# Patient Record
Sex: FEMALE | Race: WHITE
[De-identification: names, ages, dates, MRNs, and addresses within clinical notes are randomized per-mention and may not be internally consistent; named-entity substitution may affect disease eponyms.]

---

## 2020-04-26 ENCOUNTER — MESSAGE (OUTPATIENT)
Age: 30
End: 2020-04-26

## 2020-05-02 PROBLEM — Z00.00 ENCOUNTER FOR PREVENTIVE HEALTH EXAMINATION: Status: ACTIVE | Noted: 2020-05-02

## 2020-05-06 ENCOUNTER — APPOINTMENT (OUTPATIENT)
Dept: DISASTER EMERGENCY | Facility: CLINIC | Age: 30
End: 2020-05-06

## 2020-05-07 LAB
SARS-COV-2 IGG SERPL IA-ACNC: 1.3 RATIO
SARS-COV-2 IGG SERPL QL IA: POSITIVE

## 2022-08-25 ENCOUNTER — TRANSCRIPTION ENCOUNTER (OUTPATIENT)
Age: 32
End: 2022-08-25

## 2022-09-06 ENCOUNTER — OUTPATIENT (OUTPATIENT)
Dept: OUTPATIENT SERVICES | Facility: HOSPITAL | Age: 32
LOS: 1 days | Discharge: HOME | End: 2022-09-06

## 2022-09-06 DIAGNOSIS — H81.10 BENIGN PAROXYSMAL VERTIGO, UNSPECIFIED EAR: ICD-10-CM

## 2022-09-07 ENCOUNTER — RESULT REVIEW (OUTPATIENT)
Age: 32
End: 2022-09-07

## 2022-09-09 ENCOUNTER — APPOINTMENT (OUTPATIENT)
Dept: NEUROLOGY | Facility: CLINIC | Age: 32
End: 2022-09-09

## 2022-09-09 VITALS — DIASTOLIC BLOOD PRESSURE: 81 MMHG | SYSTOLIC BLOOD PRESSURE: 117 MMHG | HEART RATE: 97 BPM

## 2022-09-09 VITALS
BODY MASS INDEX: 25.11 KG/M2 | DIASTOLIC BLOOD PRESSURE: 72 MMHG | WEIGHT: 160 LBS | TEMPERATURE: 97.8 F | HEART RATE: 75 BPM | SYSTOLIC BLOOD PRESSURE: 111 MMHG | OXYGEN SATURATION: 100 % | HEIGHT: 67 IN

## 2022-09-09 DIAGNOSIS — Z78.9 OTHER SPECIFIED HEALTH STATUS: ICD-10-CM

## 2022-09-09 DIAGNOSIS — R42 DIZZINESS AND GIDDINESS: ICD-10-CM

## 2022-09-09 PROCEDURE — 99204 OFFICE O/P NEW MOD 45 MIN: CPT

## 2022-09-09 RX ORDER — MONTELUKAST SODIUM 10 MG/1
10 TABLET, FILM COATED ORAL
Refills: 0 | Status: ACTIVE | COMMUNITY

## 2022-09-09 NOTE — PHYSICAL EXAM
[FreeTextEntry1] : MS: Awake, alert, oriented to person, place, situation and time.  Follows commands. \par \par Language: Speech is clear, fluent. No dysarthria. \par \par CNs 2 - 12 intact. EOMI no nystagmus, no diplopia. No facial asymmetry b/l. Tongue midline, normal movements, no atrophy.\par \par Motor: Normal muscle bulk & tone. No noticeable tremor or seizure. No pronator drift. Muscle strength of b/l UE and b/l LE 5/5. \par \par Sensation: Intact to LT b/l throughout\par \par Cortical: No extinction\par \par Coordination: Intact rapid-alternating movements. No dysmetria to FTN. \par \par DTR: 2+ in biceps, brachioradialis and triceps; 1+ in patellar and ankle; plantars are down b/l\par \par Gait: No postural instability. Normal stance and tandem gait. Slight imbalance during rhomberg (falling backward)\par \par General: Alert and oriented to person, place, time, and situation. In no acute distress. Cooperative. Follows commands. \par Eyes: Sclera and conjunctiva were normal and pupils were equal in size, round, reactive to light, with normal accommodation\par ENT: Ears and nose normal in appearance. \par Vascular: No peripheral edema.\par Respiratory: No visible respiratory distress. \par Musculoskeletal: No involuntary movements were seen.\par Skin: Normal skin color and pigmentation.\par

## 2022-09-09 NOTE — HISTORY OF PRESENT ILLNESS
[FreeTextEntry1] : Ms. Brown is a 31yo woman with no PMHX here for evaluation of vertigo for the last 3 weeks.  She says she didn’t eat all day then went out with some friends and had dinner and was drinking alcohol and then the next morning woke up with severe vertigo.  The vertigo is described as a sensation of movement of falling backwards.  After a week she began Vestibular OT.  She saw me in the hospital and I did a quick evaluation at that time and her rhomberg was positive for falling backwards and her FTN with eyes closed showed deviation of both hands upwards.  She has continued to do vestibular therapy and symptoms have begun to improve over the last 3 days.\par \par Gets migraines intermittently 2-3 days per month\par Has been getting neck pain on the back right which has been improving with physical therapy\par

## 2022-09-09 NOTE — DISCUSSION/SUMMARY
[FreeTextEntry1] : Ms. Brown is a 33yo woman here with 3 weeks of vertigo and 2 weeks of vestibular therapy with continued vertigo.  Would obtain MRI brain and IAC w/w/o HAILE to ensure no central etiology for her persistent vertigo.\par 1. MRI brain/IAC w/w/o HAILE\par 2. Continue vestibular therapy

## 2022-09-11 ENCOUNTER — RESULT REVIEW (OUTPATIENT)
Age: 32
End: 2022-09-11

## 2022-09-11 ENCOUNTER — OUTPATIENT (OUTPATIENT)
Dept: OUTPATIENT SERVICES | Facility: HOSPITAL | Age: 32
LOS: 1 days | Discharge: HOME | End: 2022-09-11

## 2022-09-11 DIAGNOSIS — R42 DIZZINESS AND GIDDINESS: ICD-10-CM

## 2022-09-11 PROCEDURE — 70553 MRI BRAIN STEM W/O & W/DYE: CPT | Mod: 26

## 2022-10-06 ENCOUNTER — APPOINTMENT (OUTPATIENT)
Dept: ORTHOPEDIC SURGERY | Facility: CLINIC | Age: 32
End: 2022-10-06

## 2022-10-06 DIAGNOSIS — M70.51 OTHER BURSITIS OF KNEE, RIGHT KNEE: ICD-10-CM

## 2022-10-06 PROCEDURE — 99204 OFFICE O/P NEW MOD 45 MIN: CPT | Mod: 25

## 2022-10-06 PROCEDURE — 73562 X-RAY EXAM OF KNEE 3: CPT | Mod: 50

## 2022-10-06 PROCEDURE — 20610 DRAIN/INJ JOINT/BURSA W/O US: CPT

## 2022-10-09 PROBLEM — M70.51 PES ANSERINUS BURSITIS OF RIGHT KNEE: Status: ACTIVE | Noted: 2022-10-09

## 2022-10-09 NOTE — HISTORY OF PRESENT ILLNESS
[de-identified] : Patient here for evaluation right knee pain.\par Has been running with right knee pain\par \par NAD\par Right knee\par No skin breakdown\par no joint line ttp\par ttp pes \par neg graciela\par Negative lachman\par Negative varus/valgus instability\par ROM 0-130\par Pain with forced extension and flexion\par NVI\par Compartments soft and NT\par \par Xray reviewed grossly neg right knee\par \par Plan\par went over findings\par expained pes bursititis\par tx options explained\par hep\par pain control\par nsaids\par pes injection\par \par Large Joint Injection was performed because of pain/rom. Anesthesia: ethyl chloride sprayed topically.\par Dexamethasone 2 cc of 4mg.  \par Lidocaine: 2 cc of 1% \par Medication was injected in the right knee pes anserine. Patient has tried OTC's including aspirin, Ibuprofen, Aleve etc or prescription NSAIDS, and/or exercises at home and/ or physical therapy without satisfactory response. After verbal consent using sterile preparation and technique. The risks, benefits, and alternatives to cortisone injection were explained in full to the patient. Risks outlined include but are not limited to infection, sepsis, bleeding, scarring, skin discoloration, temporary increase in pain, syncopal episode, failure to resolve symptoms, allergic reaction, symptom recurrence, and elevation of blood sugar in diabetics. Patient understood the risks. All questions were answered. After discussion of options, patient requested an injection. Oral informed consent was obtained and sterile prep was done of the injection site. Sterile technique was utilized for the procedure including the preparation of the solutions used for the injection. Patient tolerated the procedure well. Advised to ice the injection site this evening. Prep with alcohol locally to site. Sterile technique used.

## 2023-03-07 ENCOUNTER — APPOINTMENT (OUTPATIENT)
Dept: PAIN MANAGEMENT | Facility: CLINIC | Age: 33
End: 2023-03-07
Payer: COMMERCIAL

## 2023-03-07 VITALS — HEIGHT: 66 IN | WEIGHT: 160 LBS | BODY MASS INDEX: 25.71 KG/M2

## 2023-03-07 DIAGNOSIS — K29.50 UNSPECIFIED CHRONIC GASTRITIS W/OUT BLEEDING: ICD-10-CM

## 2023-03-07 DIAGNOSIS — M54.2 CERVICALGIA: ICD-10-CM

## 2023-03-07 DIAGNOSIS — K21.9 GASTRO-ESOPHAGEAL REFLUX DISEASE W/OUT ESOPHAGITIS: ICD-10-CM

## 2023-03-07 DIAGNOSIS — M54.81 OCCIPITAL NEURALGIA: ICD-10-CM

## 2023-03-07 PROCEDURE — 99204 OFFICE O/P NEW MOD 45 MIN: CPT

## 2023-03-07 RX ORDER — CELECOXIB 200 MG/1
200 CAPSULE ORAL TWICE DAILY
Qty: 60 | Refills: 1 | Status: ACTIVE | COMMUNITY
Start: 2023-03-07 | End: 1900-01-01

## 2023-03-07 NOTE — REASON FOR VISIT
[Initial Visit] : an initial pain management visit [FreeTextEntry2] : Consultation for nerve block.

## 2023-03-07 NOTE — HISTORY OF PRESENT ILLNESS
[FreeTextEntry1] : Patient is a 31 y/o woman presenting for a NPV for a history of right sided neck pain with radiation to the right occiput. The patient states that she started to have the pain in 2020. No history of trauma or focal injury. Initially, the patient started to have pain once per week or so, but notes that pain has gotten progressively worse and more frequent. For the past nine months, the patient has been having this pain on a daily basis. At this point, the patient endorses having pain in the right lateral upper neck that radiates ot the right occiput to the parietal region all the way to the temporal area. She notes having some decreased sensation over this distribution as well as some warmth over the area. She had one episode where her right eyelid was drooping. \par The patient has undergone physical therapy and occupational therapy for 3-4 months without improvement. She has also been taking NSAIDs on a daily basis to the point where she has developed stomach discomfort and GERD symptoms and has had to start taking PPIs. The patient has taken naproxen and ibuprofen as well as acetaminophen prn. She has also tried methocarbamol without relief.

## 2023-03-07 NOTE — DATA REVIEWED
[MRI] : MRI [Report was reviewed and noted in the chart] : The report was reviewed and noted in the chart [I independently reviewed and interpreted images and report] : I independently reviewed and interpreted images and report [FreeTextEntry1] : MRI Brain (9/11/2022)\par FINDINGS:\par \par The bilateral cranial nerves VII and VIII and internal auditory meati demonstrate unremarkable signal intensity and morphology. There are no enhancing mass lesions within the bilateral cerebellopontine angles. There is no evidence for any abnormal enhancement within the membranous labyrinth.\par \par The bilateral sulci and ventricular volumes are appropriate for age.\par \par There is no acute infarct on diffusion weighted sequences. There is no evidence for acute intracranial hemorrhage. There is no mass effect or midline shift.\par \par There is no abnormal intracranial enhancement.\par \par There is no extra-axial fluid collection.\par \par Flow voids are seen within the intracranial arterial vessels and dural sinuses.\par \par The pituitary and posterior fossa demonstrate unremarkable signal intensity and morphology.\par \par The bilateral orbits are unremarkable.\par \par The visualized paranasal sinuses and bilateral tympanomastoid cavities are clear.\par \par IMPRESSION:\par \par No evidence for mass, abnormal signal, or abnormal enhancement involving the cerebellopontine angle cisterns, internal auditory canals, or inner ear.\par \par No acute intracranial pathology or abnormal enhancement.

## 2023-03-07 NOTE — PHYSICAL EXAM
[de-identified] : Gen: NAD\par Neck: +full ROM flexion/extension, side flexion bilaterally; paraspinal tenderness on the right\par Head: NC/AT\par Eyes: no glasses, no scleral icterus\par ENT: patient wearing a mask\par CV: RRR, S1 S2, no mrg\par Lungs: CTAB, nonlabored breathing\par Abd: soft, NT/ND\par Ext: full ROM in all extremities, no peripheral edema\par Neuro: CN intact\par UEs\par +5 L +5 R shoulder abduction\par +5 L +5 R arm abduction\par +5 L +5 R forearm flexion\par +5 L +5 R forearm extension\par +5 L +5 R finger flexion\par +5 L +5 R  strength\par LEs\par +5 L +5 R hip flexion\par +5 L +5 R leg extension\par +5 L +5 R leg flexion\par +5 L +5 R foot dorsiflexion\par +5 L +5 R foot plantarflexion\par +5 L +5 R EHL extension\par Psych: normal affect\par Skin: no visible lesions\par

## 2023-03-07 NOTE — DISCUSSION/SUMMARY
[de-identified] : Patient is a 31 y/o woman presenting for a NPV for a history of right sided neck pain with radiation to the right occiput.\par \par Prior treatment: +PT/OT for 3+ months, NSAIDs and acetaminophen OTC w/ short term relief; methocarbamol w/o relief\par \par Plan:\par 1) MRI cervical spine w/o contrast\par 2) Trial diclofenac 75mg prn\par 3) Consider trial of gabapentin, duloxetine\par 4) Consider trial of tizanidine\par 5) Consider right greater occipital nerve block\par 6) Continue PT/OT\par 7) RTC 4 weeks

## 2023-03-15 ENCOUNTER — RESULT REVIEW (OUTPATIENT)
Age: 33
End: 2023-03-15

## 2023-03-15 ENCOUNTER — OUTPATIENT (OUTPATIENT)
Dept: OUTPATIENT SERVICES | Facility: HOSPITAL | Age: 33
LOS: 1 days | End: 2023-03-15
Payer: COMMERCIAL

## 2023-03-15 DIAGNOSIS — M54.81 OCCIPITAL NEURALGIA: ICD-10-CM

## 2023-03-15 DIAGNOSIS — Z00.8 ENCOUNTER FOR OTHER GENERAL EXAMINATION: ICD-10-CM

## 2023-03-15 DIAGNOSIS — M54.2 CERVICALGIA: ICD-10-CM

## 2023-03-15 PROCEDURE — 72141 MRI NECK SPINE W/O DYE: CPT

## 2023-03-15 PROCEDURE — 72141 MRI NECK SPINE W/O DYE: CPT | Mod: 26

## 2023-03-16 DIAGNOSIS — M54.2 CERVICALGIA: ICD-10-CM

## 2023-03-16 DIAGNOSIS — M54.81 OCCIPITAL NEURALGIA: ICD-10-CM

## 2023-04-04 ENCOUNTER — APPOINTMENT (OUTPATIENT)
Dept: PAIN MANAGEMENT | Facility: CLINIC | Age: 33
End: 2023-04-04
Payer: COMMERCIAL

## 2023-04-04 VITALS
HEART RATE: 89 BPM | HEIGHT: 66 IN | SYSTOLIC BLOOD PRESSURE: 120 MMHG | BODY MASS INDEX: 25.71 KG/M2 | DIASTOLIC BLOOD PRESSURE: 80 MMHG | WEIGHT: 160 LBS

## 2023-04-04 PROCEDURE — 99214 OFFICE O/P EST MOD 30 MIN: CPT

## 2023-04-04 RX ORDER — TIZANIDINE 4 MG/1
4 TABLET ORAL TWICE DAILY
Qty: 60 | Refills: 2 | Status: ACTIVE | COMMUNITY
Start: 2023-04-04 | End: 1900-01-01

## 2023-04-04 RX ORDER — GABAPENTIN 300 MG/1
300 CAPSULE ORAL
Qty: 90 | Refills: 0 | Status: ACTIVE | COMMUNITY
Start: 2023-04-04 | End: 1900-01-01

## 2023-04-04 NOTE — DATA REVIEWED
[MRI] : MRI [Cervical Spine] : cervical spine [Report was reviewed and noted in the chart] : The report was reviewed and noted in the chart [I independently reviewed and interpreted images and report] : I independently reviewed and interpreted images and report [FreeTextEntry1] : MRI Cervical Spine (3/15/2023)\par \par FINDINGS:\par The normal cervical lordosis is preserved.\par \par Structures at the craniocervical and cervicomedullary junction are intact.\par \par The cervical vertebral body heights and alignment are maintained. The bone marrow signal is within normal limits without edema. No destructive bony lesion.\par \par The cervical spinal cord demonstrates normal course and caliber without signal abnormality. There is no cervical cord compression. There is no high-grade spinal canal/neural foraminal stenosis.\par \par The cervical intervertebral disc heights and signal intensity are preserved.\par \par There is no soft tissue neck mass or fluid collection.\par \par IMPRESSION:\par Essentially unremarkable examination of the cervical spine.

## 2023-04-04 NOTE — DISCUSSION/SUMMARY
[de-identified] : Patient is a 31 y/o woman presenting for a RPV for a history of right-sided occipital neuralgia. MRI cervical spine revealed no significant abnormalities.\par \par Prior treatments: +5 months of occupational therapy w/o improvement, diclofenac w/o relief, acetaminophen and ibuprofen prn\par \par Plan:\par 1) Schedule RIGHT occipital nerve block in office\par 2) Trial gabapentin 300mg qhs\par 3) Trial tizanidine 4mg prn\par 4) Continue ibuprofen prn\par 5) Continue acetaminophen prn\par 6) Continue occupational therapy\par 7) RTC 4-6 weeks

## 2023-04-04 NOTE — PHYSICAL EXAM
[de-identified] : Gen: NAD\par Head: NC/AT\par Neck: pain with flexion and extension; +pain in the occiput elicited with spurling's on the right, focal TTP in the right greater occipital nerve distribution\par Eyes: no glasses, no scleral icterus\par ENT: patient wearing a mask\par CV: RRR, S1 S2, no mrg\par Lungs: CTAB, nonlabored breathing\par Abd: soft, NT/ND\par Ext: full ROM in all extremities, no peripheral edema\par Neuro: CN intact\par UEs\par +5 L +5 R shoulder abduction\par +5 L +5 R arm abduction\par +5 L +5 R forearm flexion\par +5 L +5 R forearm extension\par +5 L +5 R finger flexion\par +5 L +5 R  strength\par LEs\par +5 L +5 R hip flexion\par +5 L +5 R leg extension\par +5 L +5 R leg flexion\par +5 L +5 R foot dorsiflexion\par +5 L +5 R foot plantarflexion\par +5 L +5 R EHL extension\par Psych: normal affect\par Skin: no visible lesions\par

## 2023-04-04 NOTE — HISTORY OF PRESENT ILLNESS
[FreeTextEntry1] : Patient is a 33 y/o woman presenting for a RPV for a history of right-sided occipital neuralgia. The patient states that her pain is essentially unchanged since her last visit. She continues to have pain in the mid neck that radiates to the right occiput and to the crown of the head. Triggers include sitting for long periods, reading at night, and transitioning from sitting to standing. She tried taking diclofenac but states that this did not help with her pain and thus discontinued it and rotated back to ibuprofen. She is also taking acetaminophen prn for her pain. The patient notes that these medications are somewhat effective. Pain affects her sleep initiation and she often takes medications before bed. The patient continues to do occupational therapy and intermittent massage therapy, both of which actually cause her pain to flare at times.

## 2023-04-04 NOTE — REASON FOR VISIT
[Follow-Up Visit] : a follow-up pain management visit [FreeTextEntry2] : Follow up for RT sided neck pain

## 2023-04-18 ENCOUNTER — APPOINTMENT (OUTPATIENT)
Dept: PAIN MANAGEMENT | Facility: CLINIC | Age: 33
End: 2023-04-18
Payer: COMMERCIAL

## 2023-04-18 VITALS — HEIGHT: 66 IN | WEIGHT: 160 LBS | BODY MASS INDEX: 25.71 KG/M2

## 2023-04-18 PROCEDURE — 64405 NJX AA&/STRD GR OCPL NRV: CPT

## 2023-04-18 NOTE — PROCEDURE
[Right] : of the right [Other: ____] : [unfilled] [FreeTextEntry1] : RIGHT greater occipital nerve block [FreeTextEntry2] : occipital neuralgia [FreeTextEntry3] : Procedure Date: 4/18/2023\par \par Preoperative Diagnosis: occipital neuralgia\par \par Procedure: RIGHT greater occipital nerve block\par \par Anesthesia: local\par \par Complications: none\par \par EBL: none\par \par Procedure in detail:\par Patient was seen and examined. Risks, benefits, and alternatives for the procedure were discussed with the patient in detail. The midpoint of the nuchal line between the occipital protuberance and the mastoid process and above the occipital ridge on the RIGHT was identified as the target for the procedure. The skin was prepped with alcohol pads. A 25 gauge, 1.5 inch needle was inserted into the tender points and 4ml of 0.25% bupivacaine and 1ml of 40mg/ml of depomedrol was injected. The needle was withdrawn and pressure was held with no apparent bleeding. A band aid was placed over the injection site. Patient tolerated the procedure well. The patient recovered uneventfully and was discharged home in stable condition.

## 2023-05-09 ENCOUNTER — APPOINTMENT (OUTPATIENT)
Dept: PAIN MANAGEMENT | Facility: CLINIC | Age: 33
End: 2023-05-09
Payer: COMMERCIAL

## 2023-05-09 VITALS
BODY MASS INDEX: 25.71 KG/M2 | HEIGHT: 66 IN | HEART RATE: 92 BPM | SYSTOLIC BLOOD PRESSURE: 124 MMHG | WEIGHT: 160 LBS | DIASTOLIC BLOOD PRESSURE: 83 MMHG

## 2023-05-09 PROCEDURE — 64405 NJX AA&/STRD GR OCPL NRV: CPT

## 2023-05-09 PROCEDURE — 99214 OFFICE O/P EST MOD 30 MIN: CPT | Mod: 25

## 2023-05-09 NOTE — PHYSICAL EXAM
[de-identified] : Gen: NAD\par HEENT: NC/AT, no scleral icterus\par CV: no JVD\par Resp: nonlabored breathing\par Abd: nondistended\par Ext: full ROM\par Neuro: CN intact, +focal area of tenderness in the right occiput\par Psych: normal affect\par

## 2023-05-09 NOTE — REASON FOR VISIT
[Follow-Up Visit] : a follow-up pain management visit [FreeTextEntry2] : S/P RT OCCUPITAL NERVE BLOCK

## 2023-05-09 NOTE — HISTORY OF PRESENT ILLNESS
[FreeTextEntry1] : Patient is a 31 y/o woman presenting for a RPV for a history of right-sided occipital neuralgia. The patient states that her pain improved dramatically after her RIGHT occipital nerve block on 4/18/2023. She states that her pain went from 8/10 in severity to a 2/10 in severity at its worst (80% improvement). She has only had to take medication one time since her injection and states that this was for a tension headache. The patient notes only one focal area in the right occiput where she sometimes feels a mild low level pain.

## 2023-05-09 NOTE — DISCUSSION/SUMMARY
[de-identified] : Patient is a 31 y/o woman presenting for a RPV for a history of right-sided occipital neuralgia. MRI cervical spine revealed no significant abnormalities.\par \par Prior treatments: +5 months of occupational therapy w/o improvement, diclofenac w/o relief, acetaminophen and ibuprofen prn\par 4/18/2023: 80% relief after RIGHT occipital nerve block, minimal medication requirement since\par \par Plan:\par 1) Performed 2nd RIGHT occipital nerve block\par 2) Continue ibuprofen prn\par 3) Continue acetaminophen prn\par 4) May consider repeat block in the future IF pain returns\par 5) RTC prn

## 2023-05-09 NOTE — PROCEDURE
[FreeTextEntry1] : RIGHT greater occipital nerve block [FreeTextEntry2] : right greater occipital neuralgia [FreeTextEntry3] : Preoperative Diagnosis: occipital neuralgia\par \par Procedure: RIGHT greater occipital nerve block\par \par Anesthesia: local\par \par Complications: none\par \par EBL: none\par \par Procedure in detail:\par Patient was seen and examined. Risks, benefits, and alternatives for the procedure were discussed with the patient in detail. The midpoint of the nuchal line between the occipital protuberance and the mastoid process and above the occipital ridge on the RIGHT was identified as the target for the procedure. The skin was prepped with alcohol pads. A 25 gauge, 1.5 inch needle was inserted into the tender points and 4ml of 0.5% ropivacaine and 1ml of 40mg/ml of depomedrol was injected. The needle was withdrawn and pressure was held with no apparent bleeding. A band aid was placed over the injection site. Patient tolerated the procedure well. The patient recovered uneventfully and was discharged home in stable condition.

## 2023-07-14 ENCOUNTER — OUTPATIENT (OUTPATIENT)
Dept: OUTPATIENT SERVICES | Facility: HOSPITAL | Age: 33
LOS: 1 days | End: 2023-07-14
Payer: COMMERCIAL

## 2023-07-14 DIAGNOSIS — R05.9 COUGH, UNSPECIFIED: ICD-10-CM

## 2023-07-14 PROCEDURE — 71046 X-RAY EXAM CHEST 2 VIEWS: CPT

## 2023-07-14 PROCEDURE — 71046 X-RAY EXAM CHEST 2 VIEWS: CPT | Mod: 26

## 2023-07-15 DIAGNOSIS — R05.9 COUGH, UNSPECIFIED: ICD-10-CM

## 2023-08-04 ENCOUNTER — RESULT REVIEW (OUTPATIENT)
Age: 33
End: 2023-08-04

## 2023-08-04 ENCOUNTER — OUTPATIENT (OUTPATIENT)
Dept: OUTPATIENT SERVICES | Facility: HOSPITAL | Age: 33
LOS: 1 days | End: 2023-08-04
Payer: COMMERCIAL

## 2023-08-04 DIAGNOSIS — R91.8 OTHER NONSPECIFIC ABNORMAL FINDING OF LUNG FIELD: ICD-10-CM

## 2023-08-04 DIAGNOSIS — Z00.8 ENCOUNTER FOR OTHER GENERAL EXAMINATION: ICD-10-CM

## 2023-08-04 PROCEDURE — 71250 CT THORAX DX C-: CPT | Mod: 26

## 2023-08-04 PROCEDURE — 71250 CT THORAX DX C-: CPT

## 2023-08-05 DIAGNOSIS — R91.8 OTHER NONSPECIFIC ABNORMAL FINDING OF LUNG FIELD: ICD-10-CM

## 2024-09-26 ENCOUNTER — APPOINTMENT (OUTPATIENT)
Dept: NEUROLOGY | Facility: CLINIC | Age: 34
End: 2024-09-26
Payer: COMMERCIAL

## 2024-09-26 VITALS
HEART RATE: 80 BPM | TEMPERATURE: 98 F | HEIGHT: 66 IN | OXYGEN SATURATION: 98 % | BODY MASS INDEX: 28.93 KG/M2 | DIASTOLIC BLOOD PRESSURE: 78 MMHG | WEIGHT: 180 LBS | SYSTOLIC BLOOD PRESSURE: 123 MMHG

## 2024-09-26 DIAGNOSIS — R51.9 HEADACHE, UNSPECIFIED: ICD-10-CM

## 2024-09-26 PROCEDURE — 99214 OFFICE O/P EST MOD 30 MIN: CPT

## 2024-09-26 RX ORDER — RIMEGEPANT SULFATE 75 MG/75MG
75 TABLET, ORALLY DISINTEGRATING ORAL
Qty: 8 | Refills: 5 | Status: ACTIVE | COMMUNITY
Start: 2024-09-26 | End: 1900-01-01

## 2024-09-26 RX ORDER — DROSPIRENONE AND ETHINYL ESTRADIOL 0.02-3(28)
3-0.02 KIT ORAL
Refills: 0 | Status: ACTIVE | COMMUNITY

## 2024-09-26 RX ORDER — BUDESONIDE AND FORMOTEROL FUMARATE DIHYDRATE 160; 4.5 UG/1; UG/1
160-4.5 AEROSOL RESPIRATORY (INHALATION)
Refills: 0 | Status: ACTIVE | COMMUNITY

## 2024-09-26 RX ORDER — TOPIRAMATE 25 MG/1
25 TABLET, FILM COATED ORAL
Qty: 30 | Refills: 3 | Status: ACTIVE | COMMUNITY
Start: 2024-09-26 | End: 1900-01-01

## 2024-09-26 NOTE — PHYSICAL EXAM
[FreeTextEntry1] : MS: Awake, alert, oriented to person, place, situation and time.  Follows commands.   Language: Speech is clear, fluent. No dysarthria.   CNs 2 - 12 intact. EOMI no nystagmus, no diplopia. No facial asymmetry b/l. Tongue midline, normal movements, no atrophy.  Motor: Normal muscle bulk & tone. No noticeable tremor or seizure. No pronator drift. Muscle strength of b/l UE and b/l LE 5/5. R-Shoulder discomfort with testing  Sensation: Intact to LT b/l throughout  Cortical: No extinction  Coordination: Intact rapid-alternating movements. No dysmetria to FTN.   DTR: 2+ in biceps, brachioradialis and triceps; 1+ in patellar and ankle; plantars are down b/l  Gait: No postural instability. Normal stance and tandem gait.

## 2024-09-26 NOTE — DISCUSSION/SUMMARY
[FreeTextEntry1] : Ms. Brown is a 33yo woman here with 3 weeks of vertigo and 2 weeks of vestibular therapy with continued vertigo.  Would obtain MRI brain and IAC w/w/o HAILE to ensure no central etiology for her persistent vertigo. 1. Nurtec ODT and will start topamax 25mg QHS 2. RTC in 6 months

## 2024-09-26 NOTE — REVIEW OF SYSTEMS
[As Noted in HPI] : as noted in HPI [Heartburn] : heartburn [Arthralgias] : arthralgias [Negative] : Heme/Lymph

## 2024-09-26 NOTE — HISTORY OF PRESENT ILLNESS
[FreeTextEntry1] : Since last visit she has been having episodes of discomfort starting on the right side of the neck radiating up the back of the head which can then spread to the front part of the head.  When it progresses to the front part of the head she will have light sensitivity and pain will become severe and will sometimes get nauseous.  She has been taking avil up to 800mg at a time to get rid of symptoms.  Is having this pain about 25 days out of 30.  It can be triggered when she works out, wh3en she is stressed.  Does not know if any food will trigger it.  It has been occurring for a number of years and has been getting more frequent.  From initial visit with me on 9/9/2022: Ms. Brown is a 33yo woman with no PMHX here for evaluation of vertigo for the last 3 weeks.  She says she didn't eat all day then went out with some friends and had dinner and was drinking alcohol and then the next morning woke up with severe vertigo.  The vertigo is described as a sensation of movement of falling backwards.  After a week she began Vestibular OT.  She saw me in the hospital and I did a quick evaluation at that time and her rhomberg was positive for falling backwards and her FTN with eyes closed showed deviation of both hands upwards.  She has continued to do vestibular therapy and symptoms have begun to improve over the last 3 days.  Gets migraines intermittently 2-3 days per month Has been getting neck pain on the back right which has been improving with physical therapy

## 2024-12-15 ENCOUNTER — EMERGENCY (EMERGENCY)
Facility: HOSPITAL | Age: 34
LOS: 0 days | Discharge: HOME | End: 2024-12-15
Attending: STUDENT IN AN ORGANIZED HEALTH CARE EDUCATION/TRAINING PROGRAM
Payer: COMMERCIAL

## 2024-12-15 VITALS
HEART RATE: 77 BPM | DIASTOLIC BLOOD PRESSURE: 80 MMHG | RESPIRATION RATE: 20 BRPM | SYSTOLIC BLOOD PRESSURE: 127 MMHG | OXYGEN SATURATION: 100 % | TEMPERATURE: 99 F

## 2024-12-15 VITALS
OXYGEN SATURATION: 100 % | HEART RATE: 129 BPM | RESPIRATION RATE: 16 BRPM | TEMPERATURE: 98 F | DIASTOLIC BLOOD PRESSURE: 84 MMHG | WEIGHT: 167.99 LBS | SYSTOLIC BLOOD PRESSURE: 146 MMHG

## 2024-12-15 DIAGNOSIS — R53.83 OTHER FATIGUE: ICD-10-CM

## 2024-12-15 DIAGNOSIS — R53.1 WEAKNESS: ICD-10-CM

## 2024-12-15 LAB
ALBUMIN SERPL ELPH-MCNC: 4 G/DL — SIGNIFICANT CHANGE UP (ref 3.5–5.2)
ALP SERPL-CCNC: 81 U/L — SIGNIFICANT CHANGE UP (ref 30–115)
ALT FLD-CCNC: 8 U/L — SIGNIFICANT CHANGE UP (ref 0–41)
ANION GAP SERPL CALC-SCNC: 9 MMOL/L — SIGNIFICANT CHANGE UP (ref 7–14)
AST SERPL-CCNC: 13 U/L — SIGNIFICANT CHANGE UP (ref 0–41)
BASOPHILS # BLD AUTO: 0.03 K/UL — SIGNIFICANT CHANGE UP (ref 0–0.2)
BASOPHILS NFR BLD AUTO: 0.5 % — SIGNIFICANT CHANGE UP (ref 0–1)
BILIRUB SERPL-MCNC: <0.2 MG/DL — SIGNIFICANT CHANGE UP (ref 0.2–1.2)
BUN SERPL-MCNC: 12 MG/DL — SIGNIFICANT CHANGE UP (ref 10–20)
CALCIUM SERPL-MCNC: 9.1 MG/DL — SIGNIFICANT CHANGE UP (ref 8.4–10.5)
CHLORIDE SERPL-SCNC: 106 MMOL/L — SIGNIFICANT CHANGE UP (ref 98–110)
CO2 SERPL-SCNC: 26 MMOL/L — SIGNIFICANT CHANGE UP (ref 17–32)
CREAT SERPL-MCNC: 0.9 MG/DL — SIGNIFICANT CHANGE UP (ref 0.7–1.5)
EGFR: 86 ML/MIN/1.73M2 — SIGNIFICANT CHANGE UP
EOSINOPHIL # BLD AUTO: 0.01 K/UL — SIGNIFICANT CHANGE UP (ref 0–0.7)
EOSINOPHIL NFR BLD AUTO: 0.2 % — SIGNIFICANT CHANGE UP (ref 0–8)
GLUCOSE SERPL-MCNC: 104 MG/DL — HIGH (ref 70–99)
HCT VFR BLD CALC: 39.8 % — SIGNIFICANT CHANGE UP (ref 37–47)
HGB BLD-MCNC: 13.4 G/DL — SIGNIFICANT CHANGE UP (ref 12–16)
IMM GRANULOCYTES NFR BLD AUTO: 0.2 % — SIGNIFICANT CHANGE UP (ref 0.1–0.3)
LYMPHOCYTES # BLD AUTO: 1.82 K/UL — SIGNIFICANT CHANGE UP (ref 1.2–3.4)
LYMPHOCYTES # BLD AUTO: 31 % — SIGNIFICANT CHANGE UP (ref 20.5–51.1)
MAGNESIUM SERPL-MCNC: 2.1 MG/DL — SIGNIFICANT CHANGE UP (ref 1.8–2.4)
MCHC RBC-ENTMCNC: 31.1 PG — HIGH (ref 27–31)
MCHC RBC-ENTMCNC: 33.7 G/DL — SIGNIFICANT CHANGE UP (ref 32–37)
MCV RBC AUTO: 92.3 FL — SIGNIFICANT CHANGE UP (ref 81–99)
MONOCYTES # BLD AUTO: 0.42 K/UL — SIGNIFICANT CHANGE UP (ref 0.1–0.6)
MONOCYTES NFR BLD AUTO: 7.1 % — SIGNIFICANT CHANGE UP (ref 1.7–9.3)
NEUTROPHILS # BLD AUTO: 3.59 K/UL — SIGNIFICANT CHANGE UP (ref 1.4–6.5)
NEUTROPHILS NFR BLD AUTO: 61 % — SIGNIFICANT CHANGE UP (ref 42.2–75.2)
NRBC # BLD: 0 /100 WBCS — SIGNIFICANT CHANGE UP (ref 0–0)
PLATELET # BLD AUTO: 251 K/UL — SIGNIFICANT CHANGE UP (ref 130–400)
PMV BLD: 10.5 FL — HIGH (ref 7.4–10.4)
POTASSIUM SERPL-MCNC: 4.1 MMOL/L — SIGNIFICANT CHANGE UP (ref 3.5–5)
POTASSIUM SERPL-SCNC: 4.1 MMOL/L — SIGNIFICANT CHANGE UP (ref 3.5–5)
PROT SERPL-MCNC: 7 G/DL — SIGNIFICANT CHANGE UP (ref 6–8)
RBC # BLD: 4.31 M/UL — SIGNIFICANT CHANGE UP (ref 4.2–5.4)
RBC # FLD: 11.8 % — SIGNIFICANT CHANGE UP (ref 11.5–14.5)
SODIUM SERPL-SCNC: 141 MMOL/L — SIGNIFICANT CHANGE UP (ref 135–146)
WBC # BLD: 5.88 K/UL — SIGNIFICANT CHANGE UP (ref 4.8–10.8)
WBC # FLD AUTO: 5.88 K/UL — SIGNIFICANT CHANGE UP (ref 4.8–10.8)

## 2024-12-15 PROCEDURE — 93010 ELECTROCARDIOGRAM REPORT: CPT

## 2024-12-15 PROCEDURE — 99283 EMERGENCY DEPT VISIT LOW MDM: CPT | Mod: 25

## 2024-12-15 PROCEDURE — 36000 PLACE NEEDLE IN VEIN: CPT

## 2024-12-15 PROCEDURE — 85025 COMPLETE CBC W/AUTO DIFF WBC: CPT

## 2024-12-15 PROCEDURE — 93005 ELECTROCARDIOGRAM TRACING: CPT

## 2024-12-15 PROCEDURE — 83735 ASSAY OF MAGNESIUM: CPT

## 2024-12-15 PROCEDURE — 99284 EMERGENCY DEPT VISIT MOD MDM: CPT

## 2024-12-15 PROCEDURE — 80053 COMPREHEN METABOLIC PANEL: CPT

## 2024-12-15 RX ORDER — SODIUM CHLORIDE 9 MG/ML
1000 INJECTION, SOLUTION INTRAMUSCULAR; INTRAVENOUS; SUBCUTANEOUS ONCE
Refills: 0 | Status: COMPLETED | OUTPATIENT
Start: 2024-12-15 | End: 2024-12-15

## 2024-12-15 RX ADMIN — SODIUM CHLORIDE 1000 MILLILITER(S): 9 INJECTION, SOLUTION INTRAMUSCULAR; INTRAVENOUS; SUBCUTANEOUS at 11:51

## 2024-12-15 NOTE — ED ADULT TRIAGE NOTE - PRO INTERPRETER NEED 2
BRANCH RETINAL ARTERY OCCLUSION, OD: OLD. RECOMMEND SCHEDULING VISUAL FIELD APPT. FOLLOW BP VERY CLOSE AND CONTINUE TO SEE RETINA MD AS SCHEDULED. English

## 2024-12-15 NOTE — ED PROVIDER NOTE - OBJECTIVE STATEMENT
34 year old female past medical history 34 year old female past medical history of vertigo and anxiety started taking Lexapro 3 weeks ago. patient states that she started feeling dizzy that described as a weakness/fatigue and off yesterday and is concerned that her electrolytes maybe off or she is dehydrated. patient denies chest pain and shortness of breath. no headache no dizziness.

## 2024-12-15 NOTE — ED PROVIDER NOTE - CLINICAL SUMMARY MEDICAL DECISION MAKING FREE TEXT BOX
34-year-old female history of vertigo and anxiety started Lexapro 3 weeks ago revealing some vertiginous symptoms also endorses some decreased p.o. intake after having an EGD given to the ED to evaluate her sodium to make sure that her electrolytes are okay.  No fevers or chills vs reviewed labs ekg obtained and reviewed. Patient a spoken to in detail about results  All questions addressed.  Results of ED work up discussed and patient given a copy of the results. Patient has proper follow up. Return precautions given.

## 2024-12-15 NOTE — ED ADULT TRIAGE NOTE - CHIEF COMPLAINT QUOTE
pt in er for dizziness since last night, pt NIH 0  pt has history of vertigo  pt started lexapro 3 weeks ago

## 2024-12-15 NOTE — ED PROVIDER NOTE - PATIENT PORTAL LINK FT
You can access the FollowMyHealth Patient Portal offered by [facility] by registering at the following website: [email or web address]. By joining [employer]’s FollowMyHealth portal, you will also be able to view your health information using other applications (apps) compatible with our system.

## 2024-12-15 NOTE — ED PROVIDER NOTE - ATTENDING APP SHARED VISIT CONTRIBUTION OF CARE
I personally evaluated the patient. I reviewed the Resident’s or Physician Assistant’s note (as assigned above), and agree with the findings and plan except as documented in my note.  34-year-old female history of vertigo and anxiety started Lexapro 3 weeks ago revealing some vertiginous symptoms also endorses some decreased p.o. intake after having an EGD given to the ED to evaluate her sodium to make sure that her electrolytes are okay.  No fevers or chills  CONSTITUTIONAL: WA / WN / NAD  HEAD: NCAT  EYES: PERRL; EOMI;   NECK: Supple;   MSK/EXT: No gross deformities; full range of motion.  SKIN: Warm and dry;   NEURO: AAOx3, Motor 5/5 x 4 extremities, normal speech no facial droop no drift no dysmeteria   PSYCH: Memory Intact, Normal Affect

## 2024-12-15 NOTE — ED ADULT NURSE NOTE - NSFALLRISKINTERV_ED_ALL_ED

## 2024-12-16 PROBLEM — Z78.9 OTHER SPECIFIED HEALTH STATUS: Chronic | Status: INACTIVE | Noted: 2024-12-15 | Resolved: 2024-12-15

## 2025-03-06 ENCOUNTER — RESULT REVIEW (OUTPATIENT)
Age: 35
End: 2025-03-06

## 2025-03-06 ENCOUNTER — OUTPATIENT (OUTPATIENT)
Dept: OUTPATIENT SERVICES | Facility: HOSPITAL | Age: 35
LOS: 1 days | End: 2025-03-06
Payer: COMMERCIAL

## 2025-03-06 DIAGNOSIS — M54.2 CERVICALGIA: ICD-10-CM

## 2025-03-06 PROCEDURE — 72052 X-RAY EXAM NECK SPINE 6/>VWS: CPT

## 2025-03-06 PROCEDURE — 72052 X-RAY EXAM NECK SPINE 6/>VWS: CPT | Mod: 26

## 2025-03-07 DIAGNOSIS — M54.2 CERVICALGIA: ICD-10-CM

## 2025-03-07 PROBLEM — F41.9 ANXIETY DISORDER, UNSPECIFIED: Chronic | Status: ACTIVE | Noted: 2024-12-15

## 2025-03-07 PROBLEM — R42 DIZZINESS AND GIDDINESS: Chronic | Status: ACTIVE | Noted: 2024-12-15

## 2025-04-02 ENCOUNTER — OUTPATIENT (OUTPATIENT)
Dept: OUTPATIENT SERVICES | Facility: HOSPITAL | Age: 35
LOS: 1 days | End: 2025-04-02
Payer: COMMERCIAL

## 2025-04-02 DIAGNOSIS — M54.2 CERVICALGIA: ICD-10-CM

## 2025-04-02 PROCEDURE — 97165 OT EVAL LOW COMPLEX 30 MIN: CPT | Mod: GO

## 2025-04-02 PROCEDURE — 97140 MANUAL THERAPY 1/> REGIONS: CPT | Mod: GO

## 2025-04-03 DIAGNOSIS — M54.2 CERVICALGIA: ICD-10-CM

## 2025-05-09 ENCOUNTER — APPOINTMENT (OUTPATIENT)
Dept: NEUROLOGY | Facility: CLINIC | Age: 35
End: 2025-05-09

## 2025-06-10 ENCOUNTER — APPOINTMENT (OUTPATIENT)
Dept: NEUROLOGY | Facility: CLINIC | Age: 35
End: 2025-06-10

## 2025-07-14 ENCOUNTER — NON-APPOINTMENT (OUTPATIENT)
Age: 35
End: 2025-07-14

## 2025-07-16 ENCOUNTER — APPOINTMENT (OUTPATIENT)
Dept: INFECTIOUS DISEASE | Facility: CLINIC | Age: 35
End: 2025-07-16
Payer: SELF-PAY

## 2025-07-16 PROCEDURE — 90632 HEPA VACCINE ADULT IM: CPT

## 2025-07-16 PROCEDURE — 90471 IMMUNIZATION ADMIN: CPT

## 2025-07-16 PROCEDURE — 90717 YELLOW FEVER VACCINE SUBQ: CPT

## 2025-07-16 PROCEDURE — 90691 TYPHOID VACCINE IM: CPT

## 2025-07-16 PROCEDURE — 99401 PREV MED CNSL INDIV APPRX 15: CPT

## 2025-07-16 PROCEDURE — 90472 IMMUNIZATION ADMIN EACH ADD: CPT

## 2025-07-16 RX ORDER — ACETAZOLAMIDE 125 MG/1
125 TABLET ORAL
Qty: 8 | Refills: 0 | Status: ACTIVE | COMMUNITY
Start: 2025-07-16 | End: 1900-01-01

## 2025-07-16 RX ORDER — ATOVAQUONE AND PROGUANIL HYDROCHLORIDE 250; 100 MG/1; MG/1
250-100 TABLET, FILM COATED ORAL DAILY
Qty: 23 | Refills: 0 | Status: ACTIVE | COMMUNITY
Start: 2025-07-16 | End: 1900-01-01